# Patient Record
Sex: MALE | Race: WHITE | URBAN - METROPOLITAN AREA
[De-identification: names, ages, dates, MRNs, and addresses within clinical notes are randomized per-mention and may not be internally consistent; named-entity substitution may affect disease eponyms.]

---

## 2018-06-18 ENCOUNTER — HOSPITAL ENCOUNTER (OUTPATIENT)
Dept: PHYSICAL THERAPY | Age: 58
Discharge: HOME | End: 2018-06-18
Attending: FAMILY MEDICINE
Payer: COMMERCIAL

## 2018-06-18 DIAGNOSIS — M25.511 RIGHT SHOULDER PAIN, UNSPECIFIED CHRONICITY: ICD-10-CM

## 2018-06-18 PROCEDURE — 97161 PT EVAL LOW COMPLEX 20 MIN: CPT

## 2018-06-18 NOTE — THERAPY EVALUATION
Jeffrey Yoon  : 1960  Primary: Ismael Hodgkins [facility]  Secondary:  [address]  at [facility] [place]  1101 [facility], [address] [place] [address],[address], [facility] [address].  [phone]   [phone]         OUTPATIENT PHYSICAL THERAPY:Initial Assessment 2018     ICD-10: Treatment Diagnosis: Pain in right shoulder (M25.511), Impingement syndrome of right shoulder (M75.41)  Precautions/Allergies: allergic to PCN  Fall Risk Score: 2 (? 5 = High Risk)  MD Orders: Eval and treat MEDICAL/REFERRING DIAGNOSIS:  Right shoulder pain, unspecified chronicity [M25.511]    DATE OF ONSET: about 15 months ago  REFERRING PHYSICIAN: Julio Brady MD  RETURN PHYSICIAN APPOINTMENT: PRN     INITIAL ASSESSMENT:  Mr. Erlin Martinez is a 63year old R hand dominant male with complaints of R shoulder pain. He presents with decreased AROM of R shoulder, slight weakness in ER, and signs of R shoulder impingement. He could benefit from PT to address deficits and work toward goals. PROBLEM LIST (Impacting functional limitations):  1. Decreased Strength  2. Increased Pain INTERVENTIONS PLANNED:  1. Home Exercise Program (HEP)  2. Manual Therapy  3. Therapeutic Exercise/Strengthening  4. modals as needed   TREATMENT PLAN:  Effective Dates: 2018 TO 2018 (90 days). Frequency/Duration: 1-2  times a week for 90 Days  GOALS: (Goals have been discussed and agreed upon with patient.)  Patient's stated goal is to stop having shoulder pain  Short-Term Functional Goals: Time Frame: 6 weeks  1. Patient to be independent with HEP  2. Patient to report no pain greater than 4/10 with all activity  Discharge Goals: Time Frame: 90 days  1. Patient to report no more than minimal R shoulder pain with all activity  2. Patient to report increase awareness of posture and body mechanics when using R shoulder  3. Patient to increase AROM R shoulder flex to 165 degrees for improved functional use.    Rehabilitation Potential For Stated Goals: Good  Regarding Dawson Cardenas's therapy, I certify that the treatment plan above will be carried out by a therapist or under their direction. Ariel Adame PT                  The information in this section was collected on 6/18/18 (except where otherwise noted). HISTORY:   History of Present Injury/Illness (Reason for Referral):  Patient reports that about 15 months ago he was reaching into back seat with R arm and had excruciating pain that lasted about 30 minutes. Since that time he continues to get pain with some activities, though he does not limit activity because of it. Overhead activity is usually painful. Past Medical History/Comorbidities: Dizziness, HTN, headaches. Social History/Living Environment:      Non-contributory. 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 Palpation:          Tender along R medial scapular border  ROM:          AROM L shoulder flex 175, extn 60, abduct 157, ER 70, IR T7 on back        AROM R shoulder flex 135, extn 60, abduct 150, ER 70 and IR T9 on back        PROM R shoulder flex 165, ER 90 and IR ~0 in 90 degree abduct  Strength:          L shoulde 5/5        R shoulder 5/5 except 4+ for ER  Special Tests:          Postive Neers and Rodriguez-Hira impingement tests on R shoulder  Neurological Screen:        Sensation: light touch intact in B UEs   Body Structures Involved:  1. Joints  2. Muscles Body Functions Affected:  1. Neuromusculoskeletal  2. Movement Related Activities and Participation Affected:  1. General Tasks and Demands  2.  Community, Social and [place]   Number of elements (examined above) that affect the Plan of Care: 1-2: LOW COMPLEXITY   CLINICAL PRESENTATION:   Presentation: Evolving clinical presentation with changing clinical characteristics: MODERATE COMPLEXITY   CLINICAL DECISION MAKING:   Outcome Measure: Tool Used: Disabilities of the Arm, Shoulder and Hand (DASH) Questionnaire - Quick Version  Score:  Initial: 18/55  Most Recent: X/55 (Date: -- )   Interpretation of Score: The DASH is designed to measure the activities of daily living in person's with upper extremity dysfunction or pain. Each section is scored on a 1-5 scale, 5 representing the greatest disability. The scores of each section are added together for a total score of 55. Medical Necessity:   · Patient demonstrates good rehab potential due to higher previous functional level. Reason for Services/Other Comments:  · Patient continues to require skilled intervention due to desire to use R arm without pain. Use of outcome tool(s) and clinical judgement create a POC that gives a: Questionable prediction of patient's progress: MODERATE COMPLEXITY            TREATMENT:   (In addition to Assessment/Re-Assessment sessions the following treatments were rendered)  Pre-treatment Symptoms/Complaints:  Pain with some movements  Pain: Initial:   Pain Intensity 1: 0 (None currently, but 7 at worst)   Post Session: pain not rated at end of session. Therapeutic Exercise: ( ):  5 mintues Exercises to improve strength. Required moderate visual and verbal cues to instruct in exercises. Progressed complexity of movement as indicated. instructed patient in scap retract with band, B shldr extn with band, IR and ER with band. Issued red band for HEP use. Treatment/Session Assessment:    · Response to Treatment:  Patient seemed to understand exercises well. · Compliance with Program/Exercises: Will assess as treatment progresses. · Recommendations/Intent for next treatment session: \"Next visit will focus on strength and posture. \".   Total Treatment Duration: 50 minutes  PT Patient Time In/Time Out  Time In: 0918  Time Out: Ludmila Troncoso

## 2018-06-18 NOTE — PROGRESS NOTES
Ambulatory/Rehab Services H2 Model Falls Risk Assessment    Risk Factor Pts. ·   Confusion/Disorientation/Impulsivity  []    4 ·   Symptomatic Depression  []   2 ·   Altered Elimination  []   1 ·   Dizziness/Vertigo  [x]   1 ·   Gender (Male)  [x]   1 ·   Any administered antiepileptics (anticonvulsants):  []   2 ·   Any administered benzodiazepines:  []   1 ·   Visual Impairment (specify):  []   1 ·   Portable Oxygen Use  []   1 ·   Orthostatic ? BP  []   1 ·   History of Recent Falls (within 3 mos.)  []   5     Ability to Rise from Chair (choose one) Pts. ·   Ability to rise in a single movement  [x]   0 ·   Pushes up, successful in one attempt  []   1 ·   Multiple attempts, but successful  []   3 ·   Unable to rise without assistance  []   4   Total: (5 or greater = High Risk) 2     Falls Prevention Plan:   []                Physical Limitations to Exercise (specify):   []                Mobility Assistance Device (type):   []                Exercise/Equipment Adaptation (specify):    ©2010 [facility] [address] Patent #[id number].  Federal Law prohibits the replication, distribution or use without written permission from [facility] [employer]

## 2018-06-26 ENCOUNTER — HOSPITAL ENCOUNTER (OUTPATIENT)
Dept: PHYSICAL THERAPY | Age: 58
Discharge: HOME | End: 2018-06-26
Attending: FAMILY MEDICINE
Payer: COMMERCIAL

## 2018-06-26 NOTE — PROGRESS NOTES
Chinolaurie Manzanares  : 1960  Primary: Estefanía Collins [facility]  Secondary:  [address]  at [facility] [place]  1101 [facility], [address] [place] [address],[address], [place] [address].  [phone]   [phone]         OUTPATIENT PHYSICAL THERAPY:Daily Note 2018     ICD-10: Treatment Diagnosis: Pain in right shoulder (M25.511), Impingement syndrome of right shoulder (M75.41)  Precautions/Allergies: allergic to PCN  Fall Risk Score: 2 (? 5 = High Risk)  MD Orders: Eval and treat MEDICAL/REFERRING DIAGNOSIS:  Pain in right shoulder [M25.511]    DATE OF ONSET: about 15 months ago  REFERRING PHYSICIAN: Navdeep Lama MD  RETURN PHYSICIAN APPOINTMENT: PRN     INITIAL ASSESSMENT:  Mr. Karyle Fairly is a 63year old R hand dominant male with complaints of R shoulder pain. He presents with decreased AROM of R shoulder, slight weakness in ER, and signs of R shoulder impingement. He could benefit from PT to address deficits and work toward goals. PROBLEM LIST (Impacting functional limitations):  1. Decreased Strength  2. Increased Pain INTERVENTIONS PLANNED:  1. Home Exercise Program (HEP)  2. Manual Therapy  3. Therapeutic Exercise/Strengthening  4. modals as needed   TREATMENT PLAN:  Effective Dates: 2018 TO 2018 (90 days). Frequency/Duration: 1-2  times a week for 90 Days  GOALS: (Goals have been discussed and agreed upon with patient.)  Patient's stated goal is to stop having shoulder pain  Short-Term Functional Goals: Time Frame: 6 weeks  1. Patient to be independent with HEP  2. Patient to report no pain greater than 4/10 with all activity  Discharge Goals: Time Frame: 90 days  1. Patient to report no more than minimal R shoulder pain with all activity  2. Patient to report increase awareness of posture and body mechanics when using R shoulder  3. Patient to increase AROM R shoulder flex to 165 degrees for improved functional use.    Rehabilitation Potential For Stated Goals: Good  Regarding Pauline Apgar Theresa's therapy, I certify that the treatment plan above will be carried out by a therapist or under their direction. Khloe Coughlin, PT                  The information in this section was collected on 6/18/18 (except where otherwise noted). HISTORY:   History of Present Injury/Illness (Reason for Referral):  Patient reports that about 15 months ago he was reaching into back seat with R arm and had excruciating pain that lasted about 30 minutes. Since that time he continues to get pain with some activities, though he does not limit activity because of it. Overhead activity is usually painful. Past Medical History/Comorbidities: Dizziness, HTN, headaches. Social History/Living Environment:      Non-contributory. 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 Palpation:          Tender along R medial scapular border  ROM:          AROM L shoulder flex 175, extn 60, abduct 157, ER 70, IR T7 on back        AROM R shoulder flex 135, extn 60, abduct 150, ER 70 and IR T9 on back        PROM R shoulder flex 165, ER 90 and IR ~0 in 90 degree abduct  Strength:          L shoulde 5/5        R shoulder 5/5 except 4+ for ER  Special Tests:          Postive Neers and Rodriguez-Hira impingement tests on R shoulder  Neurological Screen:        Sensation: light touch intact in B UEs   Body Structures Involved:  1. Joints  2. Muscles Body Functions Affected:  1. Neuromusculoskeletal  2. Movement Related Activities and Participation Affected:  1. General Tasks and Demands  2.  Community, Social and [place]   Number of elements (examined above) that affect the Plan of Care: 1-2: LOW COMPLEXITY CLINICAL PRESENTATION:   Presentation: Evolving clinical presentation with changing clinical characteristics: MODERATE COMPLEXITY   CLINICAL DECISION MAKING:   Outcome Measure: Tool Used: Disabilities of the Arm, Shoulder and Hand (DASH) Questionnaire - Quick Version  Score:  Initial: 18/55  Most Recent: X/55 (Date: -- )   Interpretation of Score: The DASH is designed to measure the activities of daily living in person's with upper extremity dysfunction or pain. Each section is scored on a 1-5 scale, 5 representing the greatest disability. The scores of each section are added together for a total score of 55. Medical Necessity:   · Patient demonstrates good rehab potential due to higher previous functional level. Reason for Services/Other Comments:  · Patient continues to require skilled intervention due to desire to use R arm without pain. Use of outcome tool(s) and clinical judgement create a POC that gives a: Questionable prediction of patient's progress: MODERATE COMPLEXITY            TREATMENT:   (In addition to Assessment/Re-Assessment sessions the following treatments were rendered)  Pre-treatment Symptoms/Complaints:  Pain with some movements  Pain: Initial:       Post Session: pain not rated at end of session. Therapeutic Exercise: ( ):  5 mintues Exercises to improve strength. Required moderate visual and verbal cues to instruct in exercises. Progressed complexity of movement as indicated. instructed patient in scap retract with band, B shldr extn with band, IR and ER with band. Issued red band for HEP use. Treatment/Session Assessment:    · Response to Treatment:  Patient seemed to understand exercises well. · Compliance with Program/Exercises: Will assess as treatment progresses. · Recommendations/Intent for next treatment session: \"Next visit will focus on strength and posture. \".   Total Treatment Duration:   50 minutes       Khloe Blanc, PT

## 2018-07-05 ENCOUNTER — HOSPITAL ENCOUNTER (OUTPATIENT)
Dept: PHYSICAL THERAPY | Age: 58
Discharge: HOME | End: 2018-07-05
Attending: FAMILY MEDICINE
Payer: COMMERCIAL

## 2018-07-05 PROCEDURE — 97110 THERAPEUTIC EXERCISES: CPT

## 2018-07-05 NOTE — PROGRESS NOTES
Chinolaurie Manzanares  : 1960  Primary: Estefanía Collins [facility]  Secondary:  [address]  at [facility]  1101 [facility], [address] [place] [address],[address], [place] [address].  [phone]   [phone]         OUTPATIENT PHYSICAL THERAPY:Daily Note 2018     ICD-10: Treatment Diagnosis: Pain in right shoulder (M25.511), Impingement syndrome of right shoulder (M75.41)  Precautions/Allergies: allergic to PCN  Fall Risk Score: 2 (? 5 = High Risk)  MD Orders: Eval and treat MEDICAL/REFERRING DIAGNOSIS:  Pain in right shoulder [M25.511]    DATE OF ONSET: about 15 months ago  REFERRING PHYSICIAN: Navdeep Lama MD  RETURN PHYSICIAN APPOINTMENT: PRN     INITIAL ASSESSMENT:  Mr. Karyle Fairly is a 63year old R hand dominant male with complaints of R shoulder pain. He presents with decreased AROM of R shoulder, slight weakness in ER, and signs of R shoulder impingement. He could benefit from PT to address deficits and work toward goals. PROBLEM LIST (Impacting functional limitations):  1. Decreased Strength  2. Increased Pain INTERVENTIONS PLANNED:  1. Home Exercise Program (HEP)  2. Manual Therapy  3. Therapeutic Exercise/Strengthening  4. modals as needed   TREATMENT PLAN:  Effective Dates: 2018 TO 2018 (90 days). Frequency/Duration: 1-2  times a week for 90 Days  GOALS: (Goals have been discussed and agreed upon with patient.)  Patient's stated goal is to stop having shoulder pain  Short-Term Functional Goals: Time Frame: 6 weeks  1. Patient to be independent with HEP  2. Patient to report no pain greater than 4/10 with all activity  Discharge Goals: Time Frame: 90 days  1. Patient to report no more than minimal R shoulder pain with all activity  2. Patient to report increase awareness of posture and body mechanics when using R shoulder  3. Patient to increase AROM R shoulder flex to 165 degrees for improved functional use.    Rehabilitation Potential For Stated Goals: Good                 The information in this section was collected on 6/18/18 (except where otherwise noted). HISTORY:   History of Present Injury/Illness (Reason for Referral):  Patient reports that about 15 months ago he was reaching into back seat with R arm and had excruciating pain that lasted about 30 minutes. Since that time he continues to get pain with some activities, though he does not limit activity because of it. Overhead activity is usually painful. Past Medical History/Comorbidities: Dizziness, HTN, headaches. Social History/Living Environment:      Non-contributory. 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 Palpation:          Tender along R medial scapular border  ROM:          AROM L shoulder flex 175, extn 60, abduct 157, ER 70, IR T7 on back        AROM R shoulder flex 135, extn 60, abduct 150, ER 70 and IR T9 on back        PROM R shoulder flex 165, ER 90 and IR ~0 in 90 degree abduct  Strength:          L shoulde 5/5        R shoulder 5/5 except 4+ for ER  Special Tests:          Postive Neers and Rodriguez-Hira impingement tests on R shoulder  Neurological Screen:        Sensation: light touch intact in B UEs   Body Structures Involved:  1. Joints  2. Muscles Body Functions Affected:  1. Neuromusculoskeletal  2. Movement Related Activities and Participation Affected:  1. General Tasks and Demands  2. Community, Social and Civic Life   CLINICAL DECISION MAKING:   Outcome Measure: Tool Used: Disabilities of the Arm, Shoulder and Hand (DASH) Questionnaire - Quick Version  Score:  Initial: 18/55  Most Recent: X/55 (Date: -- )   Interpretation of Score: The DASH is designed to measure the activities of daily living in person's with upper extremity dysfunction or pain.   Each section is scored on a 1-5 scale, 5 representing the greatest disability. The scores of each section are added together for a total score of 55. Medical Necessity:   · Patient demonstrates good rehab potential due to higher previous functional level. Reason for Services/Other Comments:  · Patient continues to require skilled intervention due to desire to use R arm without pain. TREATMENT:   (In addition to Assessment/Re-Assessment sessions the following treatments were rendered)  Pre-treatment Symptoms/Complaints:  Patient reports compliance with HEP. Feels like he is doing better and not sure if he needs to continue PT. Patient said he thought he is using blue band at home, but he is not sure of the color. Pain: Initial:   Pain Intensity 1: 0   Post Session: no pain at end of session. Therapeutic Exercise: (30 Minutes):  Exercises to improve strength. Required moderate visual and verbal cues to instruct in exercises. Progressed resistance and complexity of movement as indicated. .        Date:  7/5/18 Date:   Date:     Activity/Exercise Parameters Parameters Parameters   scap retract with band Blue 2x10     B shldr extn with band Blue 2x10     IR with band Blue 2x15     ER with band Blue 2x15     T's with band Green 2x10     B Prone rows 8# 2x15     B prone shldr extn 8# 2x15     B prone mid trap 3# 2x15     B prone low trap 3# 2x15     Lat pull downs 10# 1x15  13# 1x15       HEP: as above     Treatment/Session Assessment:    · Response to Treatment:  Patient without increased pain and feels he is doing well. Plans to return to PT only if pain flares up. · Compliance with Program/Exercises: yes  · Recommendations/Intent for next treatment session: \"Next visit will focus on strength and posture. \". Progress exercises if patient returns.    Total Treatment Duration:   30 minutes  PT Patient Time In/Time Out  Time In: 6654  Time Out: 0805    Gilberto Gaona, PT

## 2018-09-24 NOTE — THERAPY DISCHARGE
Lam Clay  : 1960  Primary: [facility]  Secondary:  [address]  at [facility] [place] [place]  1101 [facility], [address] [place] [address] [address], [address] [facility]  [phone]   [phone]         OUTPATIENT PHYSICAL THERAPY:Discontinuation Summary 2018     ICD-10: Treatment Diagnosis: Pain in right shoulder (M25.511), Impingement syndrome of right shoulder (M75.41)  Precautions/Allergies: allergic to PCN  Fall Risk Score: 2 (? 5 = High Risk)  MD Orders: Eval and treat  Patient attended 2 PT sessions from 18 to 18 with no missed sessions. MEDICAL/REFERRING DIAGNOSIS:  Pain in right shoulder [M25.511]    DATE OF ONSET: about 15 months ago  REFERRING PHYSICIAN: Silvio Shore MD  RETURN PHYSICIAN APPOINTMENT: PRN     INITIAL ASSESSMENT:  Mr. Josette Mann is a 63year old R hand dominant male with complaints of R shoulder pain. He presented with decreased AROM of R shoulder, slight weakness in ER, and signs of R shoulder impingement. He came to 2 PT sessions and then stopped attending. He will now be discontinued from PT. TREATMENT PLAN: Discontinue PT. Unable to assess final progress toward goals. GOALS: (Goals have been discussed and agreed upon with patient.)  Patient's stated goal is to stop having shoulder pain  Short-Term Functional Goals: Time Frame: 6 weeks  1. Patient to be independent with HEP  2. Patient to report no pain greater than 4/10 with all activity  Discharge Goals: Time Frame: 90 days  1. Patient to report no more than minimal R shoulder pain with all activity  2. Patient to report increase awareness of posture and body mechanics when using R shoulder  3. Patient to increase AROM R shoulder flex to 165 degrees for improved functional use. Rehabilitation Potential For Stated Goals: Good                 The information in this section was collected on 18 (except where otherwise noted).   HISTORY:   History of Present Injury/Illness (Reason for Referral):  Patient reports that about 15 months ago he was reaching into back seat with R arm and had excruciating pain that lasted about 30 minutes. Since that time he continues to get pain with some activities, though he does not limit activity because of it. Overhead activity is usually painful. Past Medical History/Comorbidities: Dizziness, HTN, headaches. Social History/Living Environment:      Non-contributory. 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 Palpation:          Tender along R medial scapular border  ROM:          AROM L shoulder flex 175, extn 60, abduct 157, ER 70, IR T7 on back        AROM R shoulder flex 135, extn 60, abduct 150, ER 70 and IR T9 on back        PROM R shoulder flex 165, ER 90 and IR ~0 in 90 degree abduct  Strength:          L shoulde 5/5        R shoulder 5/5 except 4+ for ER  Special Tests:          Postive Neers and Rodriguez-Hira impingement tests on R shoulder  Neurological Screen:        Sensation: light touch intact in B UEs   Body Structures Involved:  1. Joints  2. Muscles Body Functions Affected:  1. Neuromusculoskeletal  2. Movement Related Activities and Participation Affected:  1. General Tasks and Demands  2. Community, Social and Civic Life   CLINICAL DECISION MAKING:   Outcome Measure: Tool Used: Disabilities of the Arm, Shoulder and Hand (DASH) Questionnaire - Quick Version  Score:  Initial: 18/55  Most Recent: X/55 (Date: -- )   Interpretation of Score: The DASH is designed to measure the activities of daily living in person's with upper extremity dysfunction or pain. Each section is scored on a 1-5 scale, 5 representing the greatest disability. The scores of each section are added together for a total score of 55.

## 2021-12-30 ENCOUNTER — APPOINTMENT (RX ONLY)
Dept: URBAN - METROPOLITAN AREA CLINIC 349 | Facility: CLINIC | Age: 61
Setting detail: DERMATOLOGY
End: 2021-12-30

## 2021-12-30 DIAGNOSIS — Z12.83 ENCOUNTER FOR SCREENING FOR MALIGNANT NEOPLASM OF SKIN: ICD-10-CM

## 2021-12-30 DIAGNOSIS — L82.1 OTHER SEBORRHEIC KERATOSIS: ICD-10-CM

## 2021-12-30 DIAGNOSIS — L57.0 ACTINIC KERATOSIS: ICD-10-CM

## 2021-12-30 DIAGNOSIS — D22 MELANOCYTIC NEVI: ICD-10-CM

## 2021-12-30 DIAGNOSIS — D18.0 HEMANGIOMA: ICD-10-CM

## 2021-12-30 PROBLEM — D22.9 MELANOCYTIC NEVI, UNSPECIFIED: Status: ACTIVE | Noted: 2021-12-30

## 2021-12-30 PROBLEM — D18.01 HEMANGIOMA OF SKIN AND SUBCUTANEOUS TISSUE: Status: ACTIVE | Noted: 2021-12-30

## 2021-12-30 PROCEDURE — 17000 DESTRUCT PREMALG LESION: CPT

## 2021-12-30 PROCEDURE — 99203 OFFICE O/P NEW LOW 30 MIN: CPT | Mod: 25

## 2021-12-30 PROCEDURE — 17003 DESTRUCT PREMALG LES 2-14: CPT

## 2021-12-30 PROCEDURE — ? LIQUID NITROGEN

## 2021-12-30 PROCEDURE — ? COUNSELING

## 2021-12-30 ASSESSMENT — LOCATION ZONE DERM
LOCATION ZONE: FACE
LOCATION ZONE: FACE
LOCATION ZONE: SCALP
LOCATION ZONE: TRUNK

## 2021-12-30 ASSESSMENT — LOCATION DETAILED DESCRIPTION DERM
LOCATION DETAILED: LEFT MEDIAL SUPERIOR CHEST
LOCATION DETAILED: LEFT MID TEMPLE
LOCATION DETAILED: RIGHT CENTRAL FRONTAL SCALP
LOCATION DETAILED: LEFT MID TEMPLE
LOCATION DETAILED: LEFT MEDIAL INFERIOR CHEST
LOCATION DETAILED: LEFT MID-UPPER BACK
LOCATION DETAILED: LEFT INFERIOR LATERAL FOREHEAD

## 2021-12-30 ASSESSMENT — LOCATION SIMPLE DESCRIPTION DERM
LOCATION SIMPLE: LEFT TEMPLE
LOCATION SIMPLE: LEFT UPPER BACK
LOCATION SIMPLE: CHEST
LOCATION SIMPLE: LEFT TEMPLE
LOCATION SIMPLE: LEFT FOREHEAD
LOCATION SIMPLE: RIGHT SCALP

## 2021-12-30 NOTE — PROCEDURE: LIQUID NITROGEN
Detail Level: Detailed
Post-Care Instructions: I reviewed with the patient in detail post-care instructions. Patient is to wear sunprotection, and avoid picking at any of the treated lesions. Pt may apply Vaseline to crusted or scabbing areas.
Render Post-Care Instructions In Note?: no
Show Aperture Variable?: Yes
Duration Of Freeze Thaw-Cycle (Seconds): 3
Number Of Freeze-Thaw Cycles: 2 freeze-thaw cycles
Consent: The patient's consent was obtained including but not limited to risks of crusting, scabbing, blistering, scarring, darker or lighter pigmentary change, recurrence, incomplete removal and infection.

## 2021-12-30 NOTE — HPI: EVALUATION OF SKIN LESION(S)
Hpi Title: Evaluation of a Skin Lesion
Have Your Spot(S) Been Treated In The Past?: has not been treated
Additional History: Peeled off

## 2022-04-22 ENCOUNTER — HOSPITAL ENCOUNTER (OUTPATIENT)
Dept: LAB | Age: 62
Discharge: HOME | End: 2022-04-22

## 2022-04-22 PROCEDURE — 88305 TISSUE EXAM BY PATHOLOGIST: CPT

## 2022-04-22 PROCEDURE — 88312 SPECIAL STAINS GROUP 1: CPT
